# Patient Record
Sex: MALE | Race: WHITE | NOT HISPANIC OR LATINO | Employment: FULL TIME | URBAN - METROPOLITAN AREA
[De-identification: names, ages, dates, MRNs, and addresses within clinical notes are randomized per-mention and may not be internally consistent; named-entity substitution may affect disease eponyms.]

---

## 2023-08-03 ENCOUNTER — OFFICE VISIT (OUTPATIENT)
Dept: UROLOGY | Facility: CLINIC | Age: 28
End: 2023-08-03
Payer: COMMERCIAL

## 2023-08-03 VITALS
HEIGHT: 70 IN | DIASTOLIC BLOOD PRESSURE: 80 MMHG | HEART RATE: 68 BPM | WEIGHT: 198.19 LBS | SYSTOLIC BLOOD PRESSURE: 143 MMHG | BODY MASS INDEX: 28.37 KG/M2

## 2023-08-03 DIAGNOSIS — Z30.09 VASECTOMY EVALUATION: Primary | ICD-10-CM

## 2023-08-03 PROCEDURE — 3008F BODY MASS INDEX DOCD: CPT | Mod: CPTII,S$GLB,, | Performed by: UROLOGY

## 2023-08-03 PROCEDURE — 99999 PR PBB SHADOW E&M-NEW PATIENT-LVL III: ICD-10-PCS | Mod: PBBFAC,,, | Performed by: UROLOGY

## 2023-08-03 PROCEDURE — 99204 OFFICE O/P NEW MOD 45 MIN: CPT | Mod: S$GLB,,, | Performed by: UROLOGY

## 2023-08-03 PROCEDURE — 99204 PR OFFICE/OUTPT VISIT, NEW, LEVL IV, 45-59 MIN: ICD-10-PCS | Mod: S$GLB,,, | Performed by: UROLOGY

## 2023-08-03 PROCEDURE — 3077F PR MOST RECENT SYSTOLIC BLOOD PRESSURE >= 140 MM HG: ICD-10-PCS | Mod: CPTII,S$GLB,, | Performed by: UROLOGY

## 2023-08-03 PROCEDURE — 1159F PR MEDICATION LIST DOCUMENTED IN MEDICAL RECORD: ICD-10-PCS | Mod: CPTII,S$GLB,, | Performed by: UROLOGY

## 2023-08-03 PROCEDURE — 3079F DIAST BP 80-89 MM HG: CPT | Mod: CPTII,S$GLB,, | Performed by: UROLOGY

## 2023-08-03 PROCEDURE — 3077F SYST BP >= 140 MM HG: CPT | Mod: CPTII,S$GLB,, | Performed by: UROLOGY

## 2023-08-03 PROCEDURE — 3079F PR MOST RECENT DIASTOLIC BLOOD PRESSURE 80-89 MM HG: ICD-10-PCS | Mod: CPTII,S$GLB,, | Performed by: UROLOGY

## 2023-08-03 PROCEDURE — 1159F MED LIST DOCD IN RCRD: CPT | Mod: CPTII,S$GLB,, | Performed by: UROLOGY

## 2023-08-03 PROCEDURE — 99999 PR PBB SHADOW E&M-NEW PATIENT-LVL III: CPT | Mod: PBBFAC,,, | Performed by: UROLOGY

## 2023-08-03 PROCEDURE — 3008F PR BODY MASS INDEX (BMI) DOCUMENTED: ICD-10-PCS | Mod: CPTII,S$GLB,, | Performed by: UROLOGY

## 2023-08-03 RX ORDER — OXYCODONE AND ACETAMINOPHEN 5; 325 MG/1; MG/1
1 TABLET ORAL EVERY 4 HOURS PRN
Qty: 12 TABLET | Refills: 0 | Status: SHIPPED | OUTPATIENT
Start: 2023-08-03

## 2023-08-03 RX ORDER — DIAZEPAM 10 MG/1
10 TABLET ORAL
Qty: 1 TABLET | Refills: 0 | Status: SHIPPED | OUTPATIENT
Start: 2023-08-03 | End: 2023-09-02

## 2023-08-03 NOTE — PROGRESS NOTES
"[place] - Urology   Clinic Note    SUBJECTIVE:     Chief Complaint: vasectomy evaluation    Referral from: Self, Aaareferral.    History of Present Illness:  Zeyad Marie is a 28 y.o. male who presents to clinic for evaluation for a vasectomy.    Patient states that he currently has 2 children. He is  . He desires sterilization. He denies any recent hematuria or dysuria. No complaints of testicular pain.    Patient endorses no additional complaints at this time.    Previous scrotal surgery: no    Anticoagulation/Antiplatelets:  No    No past medical history on file.    No past surgical history on file.    No family history on file.    Social History     Tobacco Use    Smoking status: Every Day     Current packs/day: 0.50     Types: Cigarettes   Substance Use Topics    Alcohol use: Yes     Comment: occ    Drug use: No       Current Outpatient Medications on File Prior to Visit   Medication Sig Dispense Refill    ondansetron (ZOFRAN) 4 MG tablet Take 1 tablet (4 mg total) by mouth 3 (three) times daily as needed for Nausea. 20 tablet 0     No current facility-administered medications on file prior to visit.       Review of patient's allergies indicates:   Allergen Reactions    Augmentin [amoxicillin-pot clavulanate] Rash       Review of Systems:  A review of 10+ systems was conducted with pertinent positive and negative findings documented in HPI with all other systems reviewed and negative.    OBJECTIVE:     Estimated body mass index is 27.26 kg/m² as calculated from the following:    Height as of 7/9/16: 5' 10" (1.778 m).    Weight as of 7/9/16: 86.2 kg (190 lb).    Vital Signs (Most Recent)  There were no vitals filed for this visit.    Physical Exam:  GENERAL: patient sitting comfortably  HEENT: normocephalic  NECK: supple, no JVD  PULM: normal chest rise, no increased WOB  HEART: non-diaphoretic  ABDO: soft, nondistended, nontender  BACK: no CVA tenderness bilaterally  SKIN: warm, dry, well " perfused  EXT: no bruising or edema  NEURO: grossly normal with no focal deficits  PSYCH: appropriate mood and affect    Genitourinary Exam:  circumcised phallus with orthotopic meatus without lesions.    Bilaterally descended testes in normal position and lie without appreciable masses.    Scrotum without erythema, lesions, or masses.    Vas deferens easily palpable bilaterally    Lab Results   Component Value Date    BUN 9 07/09/2016    CREATININE 0.95 07/09/2016    WBC 11.88 07/09/2016    HGB 15.1 07/09/2016    HCT 43.3 07/09/2016     07/09/2016    AST 20 07/09/2016    ALT 23 07/09/2016    ALKPHOS 64 07/09/2016    ALBUMIN 4.7 07/09/2016        ASSESSMENT     Vasectomy Evaluation    PLAN:     Vasectomy Evaluation    - The patient desires a vasectomy. We discussed that vasectomy is intended to be a permanent form of contraception. Vasectomy also does not produce immediate sterility.    - Following vasectomy, another form of contraception is required until vas occlusion is confirmed by post-vasectomy semen analysis (PVSA), which will be completed 3 months after the vasectomy is performed.    - Even after vas occlusion is confirmed, vasectomy is not 100% reliable in preventing pregnancy. The risk of pregnancy after vasectomy is approximately 1 in 2,000 for men who have post-vasectomy azoospermia or PVSA showing rare non-motile sperm (RNMS). Repeat vasectomy is necessary in ?1% of vasectomies, provided that a technique for vas occlusion known to have a low occlusive failure rate has been used.    - Patients should refrain from ejaculation for approximately one week after vasectomy.    - Options for fertility after vasectomy include vasectomy reversal and sperm retrieval with in vitro fertilization. These options are not always successful, and they may be expensive.    - The rates of surgical complications such as symptomatic hematoma and infection are 1-2%. These rates vary with the surgeon's experience and the  criteria used to diagnose these conditions.    - Chronic scrotal pain associated with negative impact on quality of life occurs after vasectomy in about 1-2% of men. Few of these men require additional surgery.    - Other permanent and non-permanent alternatives to vasectomy are available.    - The patient had an opportunity to ask questions. Risks, benefits, and all alternative options were discussed. All of his concerns were addressed.    - We will plan to proceed with vasectomy in the clinic. He will be prescribed one tablet of 10mg Valium to take before the procedure, as well as pain medication to be taken after the procedure.     Mychal Blas MD  Urology  [facility] [place]    Disclaimer: This note has been generated using voice-recognition software. There may be typographical errors that have been missed during proof-reading.

## 2023-08-17 ENCOUNTER — PROCEDURE VISIT (OUTPATIENT)
Dept: UROLOGY | Facility: CLINIC | Age: 28
End: 2023-08-17
Payer: COMMERCIAL

## 2023-08-17 VITALS
HEIGHT: 70 IN | SYSTOLIC BLOOD PRESSURE: 146 MMHG | WEIGHT: 197 LBS | HEART RATE: 81 BPM | BODY MASS INDEX: 28.2 KG/M2 | DIASTOLIC BLOOD PRESSURE: 85 MMHG

## 2023-08-17 DIAGNOSIS — Z30.09 VASECTOMY EVALUATION: ICD-10-CM

## 2023-08-17 PROCEDURE — 88302 PR  SURG PATH,LEVEL II: ICD-10-PCS | Mod: 26,,, | Performed by: PATHOLOGY

## 2023-08-17 PROCEDURE — 88302 TISSUE EXAM BY PATHOLOGIST: CPT | Mod: 59 | Performed by: PATHOLOGY

## 2023-08-17 PROCEDURE — 88302 TISSUE EXAM BY PATHOLOGIST: CPT | Mod: 26,,, | Performed by: PATHOLOGY

## 2023-08-17 PROCEDURE — 55250 PR REMOVAL OF SPERM DUCT(S): ICD-10-PCS | Mod: S$GLB,,, | Performed by: UROLOGY

## 2023-08-17 PROCEDURE — 55250 REMOVAL OF SPERM DUCT(S): CPT | Mod: S$GLB,,, | Performed by: UROLOGY

## 2023-08-17 NOTE — PROCEDURES
[facility] Urology  Procedure Note       Surgery Date: 08/17/2023     Pre-op Diagnosis:    Vasectomy status    Post-op Diagnosis:    Vasectomy Status    Procedure Performed:   Bilateral Vasectomy    Staff Surgeon:   Mychal Blas MD    Assistant Surgeon:   None    Anesthesia:   Local    Estimated Blood Loss:   Minimal         Specimens:   Right Vas Deferens  Left Vas Deferens    Drains/Implants:   None    Complications:   None    Indications:   Patient is a 28 y.o. year old male who presented to clinic desiring sterilization with vasectomy. The risks and benefits of the procedure were discussed in the clinic. All of his questions were answered. He signed consents and elected to proceed with bilateral vasectomy, which was then scheduled for today.    Description of the Procedure:   The patient was brought to the procedure suite and placed in supine position. He was then prepped in standard sterile fashion. Time-out was performed.    I started by performing a block by injecting local anesthetic along the vas on the left side. I then injected local anesthetic along the skin on the left side over the vas and raised a wheel.  A sharp dissector was used to open a 1 cm skin incision over the left vas.  The left vas deferens was then grasped with a ringed vas clamp.  The left vas was then externalized to the incision.  The overlying fascial attachments were dissected off using a combination of electrocautery and sharp dissection.  Once this was completed the vas was completely exposed.  A sharp clamp was used to develop a space underneath the vas but above the overlying small vessels.  A clamp was applied proximally and distally.  Titanium clips were placed proximally and distally at the base of the clamp.  The vas was then divided sharply and a small segment was sent for pathology.  Hemostasis was achieved with electrocautery.  Once there was good hemostasis the proximal end was first released and a tissue interposition  with the dartos fascia was performed over the proximal end using a chromic suture.  The distal end was then released into the incision. The skin was closed with a chromic suture in interrupted fashion.    An identical procedure was performed on the patient's right side. I started by performing a block by injecting local anesthetic along the vas on the right side.  I then injected local anesthetic along the skin on the right side over the vas and raised a wheel.  A sharp dissector was used to open a 1 cm skin incision over the vas.  The right vas deferens was then grasped with a ringed vas clamp.  The right vas was then externalized through the incision.  The overlying fascial attachments were dissected off using a combination of electrocautery and sharp dissection.  Once this was completed the vas was completely exposed.  A sharp clamp was used to develop a space underneath the vas but above the overlying small vessels.  A clamp was applied proximally and distally.  Titanium clips were placed proximally and distally at the base of the clamp.  The vas was then divided sharply and a small segment was sent for pathology.  Hemostasis was achieved with electrocautery.  Once there was good hemostasis the proximal end was first released and a tissue interposition with the dartos fascia was performed over the proximal end using a chromic suture.  The distal end was then released into the incision. The skin was closed with a chromic suture in interrupted fashion.    The wounds was then cleaned and dried. Scrotal support was then applied. Bacitracin ointment was applied.    All needle and lap counts were correct. The patient tolerated the procedure well. No complications occurred during or immediately after the procedure. The patient was taken to the PACU satisfactory condition.     Disposition: He will have a post procedural semen analysis performed in 3 months. Patient counseled to continue using contraception until after a  semen analysis confirms that he is sterile.     Mychal Blas MD  Urology  [facility] [place]

## 2023-08-23 LAB
FINAL PATHOLOGIC DIAGNOSIS: NORMAL
Lab: NORMAL